# Patient Record
(demographics unavailable — no encounter records)

---

## 2025-07-18 NOTE — HISTORY OF PRESENT ILLNESS
[TextBox_4] : 54yo presents for annual exam  still getting regular menses  denies menopasual S&S  - every once in awhile feels hot - rare, exhausted, only complaint is bladder is leaking - uses liner everyday  drinks a ton of water, goes to bathroom all the time, feels constantly wet though [Mammogramdate] : 2024 [PapSmeardate] : 2024

## 2025-07-18 NOTE — PHYSICAL EXAM
[MA] : MA [Appropriately responsive] : appropriately responsive [Alert] : alert [No Acute Distress] : no acute distress [Soft] : soft [Non-tender] : non-tender [Non-distended] : non-distended [Oriented x3] : oriented x3 [Examination Of The Breasts] : a normal appearance [No Masses] : no breast masses were palpable [Labia Majora] : normal [Labia Minora] : normal [Scant] : There was scant vaginal bleeding [Normal] : normal [Uterine Adnexae] : normal